# Patient Record
Sex: FEMALE | Race: WHITE
[De-identification: names, ages, dates, MRNs, and addresses within clinical notes are randomized per-mention and may not be internally consistent; named-entity substitution may affect disease eponyms.]

---

## 2018-02-20 ENCOUNTER — HOSPITAL ENCOUNTER (OUTPATIENT)
Age: 78
Discharge: HOME | End: 2018-02-20

## 2018-02-20 ENCOUNTER — HOSPITAL ENCOUNTER (OUTPATIENT)
Dept: HOSPITAL 91 - RAD | Age: 78
Discharge: HOME | End: 2018-02-20
Payer: MEDICARE

## 2018-02-20 DIAGNOSIS — R05: Primary | ICD-10-CM

## 2018-02-20 DIAGNOSIS — R06.02: ICD-10-CM

## 2018-02-20 PROCEDURE — 71045 X-RAY EXAM CHEST 1 VIEW: CPT

## 2019-01-01 ENCOUNTER — OFFICE (OUTPATIENT)
Dept: URBAN - METROPOLITAN AREA CLINIC 45 | Facility: CLINIC | Age: 79
End: 2019-01-01

## 2019-01-01 ENCOUNTER — AMBULATORY SURGICAL CENTER (OUTPATIENT)
Dept: URBAN - METROPOLITAN AREA SURGERY 28 | Facility: SURGERY | Age: 79
End: 2019-01-01

## 2019-01-01 VITALS
WEIGHT: 140 LBS | HEART RATE: 79 BPM | DIASTOLIC BLOOD PRESSURE: 97 MMHG | HEIGHT: 56 IN | SYSTOLIC BLOOD PRESSURE: 144 MMHG

## 2019-01-01 VITALS
HEIGHT: 56 IN | SYSTOLIC BLOOD PRESSURE: 139 MMHG | DIASTOLIC BLOOD PRESSURE: 84 MMHG | WEIGHT: 140 LBS | HEART RATE: 89 BPM

## 2019-01-01 VITALS
HEART RATE: 73 BPM | HEIGHT: 56 IN | DIASTOLIC BLOOD PRESSURE: 78 MMHG | RESPIRATION RATE: 16 BRPM | WEIGHT: 140 LBS | SYSTOLIC BLOOD PRESSURE: 127 MMHG | OXYGEN SATURATION: 98 % | TEMPERATURE: 97.7 F

## 2019-01-01 VITALS
HEIGHT: 56 IN | HEART RATE: 76 BPM | DIASTOLIC BLOOD PRESSURE: 81 MMHG | SYSTOLIC BLOOD PRESSURE: 131 MMHG | WEIGHT: 135 LBS

## 2019-01-01 VITALS
SYSTOLIC BLOOD PRESSURE: 122 MMHG | DIASTOLIC BLOOD PRESSURE: 85 MMHG | WEIGHT: 135 LBS | HEIGHT: 56 IN | HEART RATE: 89 BPM

## 2019-01-01 DIAGNOSIS — K31.7 POLYP OF STOMACH AND DUODENUM: ICD-10-CM

## 2019-01-01 DIAGNOSIS — E66.3 OVERWEIGHT: ICD-10-CM

## 2019-01-01 DIAGNOSIS — K44.9 DIAPHRAGMATIC HERNIA WITHOUT OBSTRUCTION OR GANGRENE: ICD-10-CM

## 2019-01-01 DIAGNOSIS — D64.9 ANEMIA, UNSPECIFIED: ICD-10-CM

## 2019-01-01 DIAGNOSIS — D18.03 HEMANGIOMA OF LIVER: ICD-10-CM

## 2019-01-01 DIAGNOSIS — R63.4 ABNORMAL WEIGHT LOSS: ICD-10-CM

## 2019-01-01 DIAGNOSIS — R10.13: ICD-10-CM

## 2019-01-01 DIAGNOSIS — K59.01 SLOW TRANSIT CONSTIPATION: ICD-10-CM

## 2019-01-01 DIAGNOSIS — K59.01: ICD-10-CM

## 2019-01-01 DIAGNOSIS — D18.03: ICD-10-CM

## 2019-01-01 DIAGNOSIS — K59.01 CONSTIPATION, SLOW TRANSIT: ICD-10-CM

## 2019-01-01 DIAGNOSIS — R19.4 CHANGE IN BOWEL HABITS: ICD-10-CM

## 2019-01-01 DIAGNOSIS — D50.9 IRON DEFICIENCY ANEMIA: ICD-10-CM

## 2019-01-01 DIAGNOSIS — Z87.11 PERSONAL HISTORY PEPTIC ULCER DISEASE: ICD-10-CM

## 2019-01-01 DIAGNOSIS — D12.0 BENIGN NEOPLASM OF CECUM: ICD-10-CM

## 2019-01-01 DIAGNOSIS — K76.0 NON-ALCOHOLIC FATTY LIVER: ICD-10-CM

## 2019-01-01 DIAGNOSIS — R19.09: ICD-10-CM

## 2019-01-01 DIAGNOSIS — R19.4 CHANGE IN BOWEL HABIT: ICD-10-CM

## 2019-01-01 LAB
CT ABDOMEN AND PELVIS W WO: PDF REPORT: (no result)
Lab: (no result)
SURGICAL: PDF REPORT: (no result)

## 2019-01-01 PROCEDURE — 99215 OFFICE O/P EST HI 40 MIN: CPT | Performed by: INTERNAL MEDICINE

## 2019-01-01 PROCEDURE — 99204 OFFICE O/P NEW MOD 45 MIN: CPT | Performed by: INTERNAL MEDICINE

## 2019-01-01 PROCEDURE — 45385 COLONOSCOPY W/LESION REMOVAL: CPT | Performed by: INTERNAL MEDICINE

## 2019-01-01 PROCEDURE — 45380 COLONOSCOPY AND BIOPSY: CPT | Mod: 59 | Performed by: INTERNAL MEDICINE

## 2019-01-01 PROCEDURE — 43239 EGD BIOPSY SINGLE/MULTIPLE: CPT | Performed by: INTERNAL MEDICINE

## 2019-01-01 PROCEDURE — 99214 OFFICE O/P EST MOD 30 MIN: CPT | Performed by: INTERNAL MEDICINE

## 2019-01-01 RX ORDER — LINACLOTIDE 290 UG/1
290 CAPSULE, GELATIN COATED ORAL
Qty: 30 | Status: ACTIVE
Start: 2019-01-01

## 2019-06-10 NOTE — SERVICEHPINOTES
The patient is seen for evaluation of recent change in bowel habits, worsening of constipation associated with abdominal discomfort.    Notes the onset of constipation at least   several  weeks   ago.  Symptoms started somewhat   abruptly  .   Currently, the patient evacuates stool around   1   -2 times per   week while using OTC laxatives, however reports no fecal material other than watery output  .    The stools are   .   They are typically    in color.   Associated symptoms include   alternating bowel pattern, abdominal bloating/distension, generalized abdominal pain and excessive straining during defecation  .  Symptoms are alleviated with   .  The patient has has a known history of iron deficiency anemia controlled by daily use of iron supplements.  She also recalls receiving iron infusion about a year ago. Patien medicates symptoms of constipation with   fiber supplements, stool softeners and Miralax (PEG)  .   The patient has undergone a colonoscopy    ago.  Findings on that exam included   .   There has been no previous colon screening for over 15 years. The patient has no family history of colon cancer or other significant GI diseases. Patient denies fever, nausea, vomiting, dysphagia, reflux, rectal bleeding, melena.  She describes losing over 5 pounds of weight over the last month. Denies shortness of breath and chest pain.

## 2019-06-20 NOTE — SERVICEHPINOTES
The patient transported complains of system epigastric abdominal pain and constipation. Currently, the patient evacuates stool around 1 -2 times per week while using OTC laxatives, however reports no fecal material other than watery output. Associated symptoms include alternating bowel pattern, abdominal bloating/distension, generalized abdominal pain and excessive straining during defecation. The patient was scheduled for an EGD and colonoscopy within 2 weeks, however, is requesting to have the procedure earlier if possible. She was started on daily PPI Dexilant by her PCP several days ago, but has not noticed any improvement of her pain yet. She has discontinued previously used NSAIDs and aspirin. There has been no previous colon screening for over 15 years. The patient has no family history of colon cancer or other significant GI diseases. Patient denies fever, nausea, vomiting, dysphagia, reflux, rectal bleeding, melena. She describes losing over 5 pounds of weight over the last month. Denies shortness of breath and chest pain.

## 2019-06-24 PROBLEM — D12.0 BENIGN NEOPLASM OF CECUM: Status: ACTIVE | Noted: 2019-01-01

## 2019-06-24 PROBLEM — D12.4 BENIGN NEOPLASM OF DESCENDING COLON: Status: ACTIVE | Noted: 2019-01-01

## 2019-07-02 PROBLEM — K31.7 POLYP OF STOMACH AND DUODENUM: Status: ACTIVE | Noted: 2019-01-01

## 2019-07-02 NOTE — SERVICEHPINOTES
The patient returns after recent EGD and colonoscopy done to evaluate prolonged moderate and at times severe substernal/epigastric abdominal pain and recent worsening of constipation. She was initially started on daily PPI Dexilant by her PCP, but has not noticed any improvement. She has discontinued previously used NSAIDs and aspirin.  Patient had unremarkable recent lab work.  Abdominal ultrasound done of her primary care physician's office demonstrated fatty liver, contracted gallbladder, couple of small nonvascular liver lesions suggestive of hemangiomas, which appeared to have increased in size since 2017. EGD demonstrated a fairly large sliding hiatal hernia, small benign gastric polyp. There was no evidence of H. pylori infection or celiac sprue found at random biopsies. Colonoscopy was overall unremarkable, couple of small benign polyps were removed. There has been no previous colon screening for over 15 years. Patient denies fever, nausea, vomiting, dysphagia, reflux, rectal bleeding, melena. She describes losing total of about 15 pounds of weight since onset of her pain. Denies pleuritic or positional change in pain, shortness of breath and chest pain.

## 2019-07-10 PROBLEM — K44.9 DIAPHRAGMATIC HERNIA WITHOUT OBSTRUCTION OR GANGRENE: Status: ACTIVE | Noted: 2019-01-01
